# Patient Record
Sex: FEMALE | Race: WHITE
[De-identification: names, ages, dates, MRNs, and addresses within clinical notes are randomized per-mention and may not be internally consistent; named-entity substitution may affect disease eponyms.]

---

## 2021-10-19 ENCOUNTER — HOSPITAL ENCOUNTER (OUTPATIENT)
Dept: HOSPITAL 96 - M.ULTRA | Age: 66
End: 2021-10-19
Attending: INTERNAL MEDICINE
Payer: COMMERCIAL

## 2021-10-19 DIAGNOSIS — Z90.49: ICD-10-CM

## 2021-10-19 DIAGNOSIS — R10.11: Primary | ICD-10-CM

## 2021-10-21 ENCOUNTER — HOSPITAL ENCOUNTER (EMERGENCY)
Dept: HOSPITAL 96 - M.ERS | Age: 66
Discharge: LEFT BEFORE DISCHARGE | End: 2021-10-21
Payer: COMMERCIAL

## 2021-10-21 VITALS — HEIGHT: 63 IN | BODY MASS INDEX: 27.64 KG/M2 | WEIGHT: 156 LBS

## 2021-10-21 VITALS — DIASTOLIC BLOOD PRESSURE: 61 MMHG | SYSTOLIC BLOOD PRESSURE: 99 MMHG

## 2021-10-21 DIAGNOSIS — R07.89: Primary | ICD-10-CM

## 2021-10-21 DIAGNOSIS — Z53.21: ICD-10-CM

## 2021-10-21 NOTE — EKG
[place] [address]
Phone:  [phone]                     ELECTROCARDIOGRAM REPORT      
_______________________________________________________________________________
 
Name:         JUANCARLOS MARTINEZ          Room:                     REG ER 
M.R.#:    [id number]     Account #:     [id number]  
Admission:    10/21/21    Attend Phys:                     
Discharge:                Date of Birth: [birth year]  
Date of Service: 10/21/21 1434  Report #:      [id number]
        [id number]
_______________________________________________________________________________
THIS REPORT FOR:  //name//                      
 
                         [facility] ED
                                       
Test Date:    2021-10-21               Test Time:    14:34:18
Pat Name:     JUANCARLOS MARTINEZ          Department:   
Patient ID:   [id number]            Room:          
Gender:       F                        Technician:   RAFAEL
:          1955               Requested By: Nadeem Gar
Order Number: [id number] MD:   Karthik Craig
                                 Measurements
Intervals                              Axis          
Rate:         88                       P:            67
UT:           141                      QRS:          29
QRSD:         93                       T:            43
QT:           384                                    
QTc:          465                                    
                           Interpretive Statements
Sinus rhythm
artifact noted
Low voltage, precordial leads
RSR' in V1 or V2, right VCD or RVH
Compared to ECG 2017 17:16:12
 
RSR' in V1 or V2 now present
Electronically Signed On 10- 15:34:50 CDT by Karthik Craig
[email or web address]
 
 
 
 
 
 
 
 
 
 
 
 
 
 
 
 
 
  <ELECTRONICALLY SIGNED>
                                           By: Karthik Craig MD, FACC      
  10/21/21     1534
D: 10/21/21 1434   _____________________________________
T: 10/21/21 1434   Karthik Craig MD, FACC        /EPI

## 2022-01-17 ENCOUNTER — HOSPITAL ENCOUNTER (OUTPATIENT)
Dept: HOSPITAL 96 - M.ULTRA | Age: 67
End: 2022-01-17
Attending: INTERNAL MEDICINE
Payer: COMMERCIAL

## 2022-01-17 DIAGNOSIS — I25.118: Primary | ICD-10-CM

## 2022-01-17 DIAGNOSIS — I10: ICD-10-CM

## 2022-01-18 NOTE — CARDNUC
[place] [address]
Phone:  [phone]                     CARDIAC NUCLEAR IMAGING REPORT
_______________________________________________________________________________
 
Name:         JUANCARLOS MARTINEZ          Room:                     REG CLI
M.R.#:    [id number]     Account #:     [id number]  
Admission:    01/17/22    Attend Phys:   Arun Mast,
Discharge:                Date of Birth: [birth year]  
Date of Service: 01/18/22 1435  Report #:      [id number]
        [id number] 
_______________________________________________________________________________
THIS REPORT FOR:
 
cc:  Ghanshyam Harris MD, Meng MD Liston, Michael J. MD [facility]     
                                                                       ~
 
--------------- APPROVED REPORT --------------
 
 
Study performed:  01/17/2022 09:12:44
 
Indication: Chest pain
Patient Location: Out-Patient
Stress Nurse: Sailaja Matthews RN
 
Ht: 5 ft 3 in  Wt: 153 lbs  BSA:  1.73 m2
    BMI:  27.09
 
Medical History
Medical History: CAD s/p stent
Medications: asa-325. lasix, sisinopril, metorprolol, kcl, 
zocor
Allergies: diclofenic, mprphine, sulfa
Cardiac Risk Factors: Age, Current Smoker, Hyperlipidemia, , HTN, 
PVD
Previous Cardiac Procedures: PCI
Exercise History: Indeterminate
Meds Held (24 hrs): metoprolol
 
Resting Data
Rest SPECT myocardial perfusion imaging was performed in supine 
position 30 minutes following the intravenous injection of 9.7 mCi of 
Tc-99m Sestamibi.
Time of rest injection: 08:00     
The images were gated to evaluate regional wall motion and calculate 
left ventricular ejection fraction. 
Administration Route: IV
Administration Site: Right Wrist
 
Pharmacologic Stress
Pharmacologic stress test was performed by injecting Regadenoson 0.4 
mg IV push over 10-15 seconds immediately followed by the intravenous 
injection of 35.7 mCi of Tc-99m Sestamibi.
Time of stress injection: 09:15     
Administration Route: IV
 
 
[place] [address]
Phone:  [phone]                     CARDIAC NUCLEAR IMAGING REPORT
_______________________________________________________________________________
 
Name:         JUANCARLOS MARTINEZ          Room:                     REG CLI
M.R.#:    [id number]     Account #:     [id number]  
Admission:    01/17/22    Attend Phys:   Arun Mast,
Discharge:                Date of Birth: [birth year]  
Date of Service: 01/18/22 1435  Report #:      [id number]
        [id number] 
_______________________________________________________________________________
Administration Site: Right Wrist
Heart Rate at time of stress injection: 106 bpm.
The images were gated to evaluate regional wall motion and calculate 
left ventricular ejection fraction. 
Prone imaging was performed.
 
Stress Test Details
Stress Test:  Pharmacologic stress testing performed using 0.4 mg of 
regadenoson per 5 mL given IV over 10 seconds.      
  Reason for pharmacologic stress test: fall risk.      
 
HR      Max Heart Rate (APMHR): 154 bpm  
Resting HR:            98 bpm   Target HR (85% APMHR): 130 bpm  
Max HR Achieved:  101 bpm        
% of APMHR:         65         
Recovery HR:            88 bpm        
 
BP           
Resting BP:  96/58 mmHg         
Max BP:       105/61 mmHg        
Recovery BP:       113/67 mmHg        
ECG           
Resting ECG:  Sinus Rhythm        
Stress ECG:     Sinus Tachycardia       
ST Change: None          
Arrhythmia:    None         
Recovery ECG: Sinus Rhythm        
Recovery ST Change: None        
Recovery Arrhythmia: None        
 
Clinical
Reason for Termination: Completed protocol
The patient tolerated Lexiscan infusion without significant cardiac 
symptoms.
 
Stress ECG Conclusion
The baseline twelve-lead EKG shows sinus rhythm without significant 
ST segment or T wave abnormality.  EKGs obtained during and post 
Lexiscan infusion show sinus rhythm and sinus tachycardia with no 
significant ST segment changes when compared to baseline.  There were 
no stress-induced arrhythmias.
 
Study Quality
Study: Good
Artifact: No artifact 
 
 
 
[place] [address]
Phone:  [phone]                     CARDIAC NUCLEAR IMAGING REPORT
_______________________________________________________________________________
 
Name:         JUANCARLOS MARTINEZN          Room:                     REG CLI
M.R.#:    [id number]     Account #:     [id number]  
Admission:    01/17/22    Attend Phys:   Arun Mast,
Discharge:                Date of Birth: [birth year]  
Date of Service: 01/18/22 1435  Report #:      [id number]
        [id number] 
_______________________________________________________________________________
Study Data
At rest, the left ventricular ejection fraction was 83%..   
Post stress, the left ventricular ejection was 89%..   
TID = 0.73.       
 
Perfusion
Perfusion images obtained at rest and post Lexiscan stress show 
uniform uptake of the radioisotope throughout the myocardium.  There 
were no defects to suggest infarct or ischemia.
 
Wall Motion
Normal left ventricular wall motion.
 
Nuclear Conclusion
ECG Findings: negative for ischemia 
Clinical Findings: negative for ischemia 
Nuclear Findings: negative for ischemia 
Exercise Capacity: not assessed
Left Ventricular Function: normal 
Risk Study: low
Perfusion images show no defect to suggest infarct or ischemia.  Left 
ventricular systolic function appears normal on gated studies.  This 
is a low risk study. 
 
<Conclusion>
The baseline twelve-lead EKG shows sinus rhythm without significant 
ST segment or T wave abnormality.  EKGs obtained during and post 
Lexiscan infusion show sinus rhythm and sinus tachycardia with no 
significant ST segment changes when compared to baseline.  There were 
no stress-induced arrhythmias.
 
 
 
 
 
 
 
 
 
 
 
 
 
 
  <ELECTRONICALLY SIGNED>
                                           By: Arun Mast MD, FACC   
  01/18/22     1435
D: 01/18/22 1435   _____________________________________
T: 01/18/22 1435   Arun Mast MD, FACC     /INF